# Patient Record
Sex: FEMALE | Race: WHITE | NOT HISPANIC OR LATINO | Employment: OTHER | URBAN - METROPOLITAN AREA
[De-identification: names, ages, dates, MRNs, and addresses within clinical notes are randomized per-mention and may not be internally consistent; named-entity substitution may affect disease eponyms.]

---

## 2020-03-07 ENCOUNTER — HOSPITAL ENCOUNTER (EMERGENCY)
Facility: HOSPITAL | Age: 70
Discharge: HOME | End: 2020-03-07
Attending: EMERGENCY MEDICINE
Payer: MEDICARE

## 2020-03-07 VITALS
OXYGEN SATURATION: 100 % | HEART RATE: 64 BPM | TEMPERATURE: 98 F | RESPIRATION RATE: 16 BRPM | DIASTOLIC BLOOD PRESSURE: 62 MMHG | SYSTOLIC BLOOD PRESSURE: 138 MMHG

## 2020-03-07 DIAGNOSIS — N17.9 AKI (ACUTE KIDNEY INJURY): Primary | ICD-10-CM

## 2020-03-07 DIAGNOSIS — E86.0 DEHYDRATION: ICD-10-CM

## 2020-03-07 DIAGNOSIS — R79.89 ELEVATED SERUM CREATININE: ICD-10-CM

## 2020-03-07 DIAGNOSIS — R73.9 HYPERGLYCEMIA: ICD-10-CM

## 2020-03-07 DIAGNOSIS — R41.82 ACUTE ALTERATION IN MENTAL STATUS: ICD-10-CM

## 2020-03-07 DIAGNOSIS — R00.1 BRADYCARDIA: ICD-10-CM

## 2020-03-07 LAB
ALBUMIN SERPL BCP-MCNC: 3.3 G/DL (ref 3.5–5.2)
ALLENS TEST: ABNORMAL
ALP SERPL-CCNC: 67 U/L (ref 55–135)
ALT SERPL W/O P-5'-P-CCNC: 10 U/L (ref 10–44)
AMMONIA PLAS-SCNC: 19 UMOL/L (ref 10–50)
ANION GAP SERPL CALC-SCNC: 12 MMOL/L (ref 8–16)
AST SERPL-CCNC: 16 U/L (ref 10–40)
B-OH-BUTYR BLD STRIP-SCNC: 0.1 MMOL/L (ref 0–0.5)
BACTERIA #/AREA URNS HPF: ABNORMAL /HPF
BASOPHILS # BLD AUTO: 0.04 K/UL (ref 0–0.2)
BASOPHILS NFR BLD: 0.4 % (ref 0–1.9)
BILIRUB SERPL-MCNC: 0.4 MG/DL (ref 0.1–1)
BILIRUB UR QL STRIP: NEGATIVE
BNP SERPL-MCNC: 36 PG/ML (ref 0–99)
BUN SERPL-MCNC: 24 MG/DL (ref 8–23)
CALCIUM SERPL-MCNC: 8.9 MG/DL (ref 8.7–10.5)
CHLORIDE SERPL-SCNC: 98 MMOL/L (ref 95–110)
CLARITY UR: CLEAR
CO2 SERPL-SCNC: 26 MMOL/L (ref 23–29)
COLOR UR: YELLOW
CREAT SERPL-MCNC: 2.2 MG/DL (ref 0.5–1.4)
DIFFERENTIAL METHOD: ABNORMAL
EOSINOPHIL # BLD AUTO: 0.2 K/UL (ref 0–0.5)
EOSINOPHIL NFR BLD: 1.6 % (ref 0–8)
ERYTHROCYTE [DISTWIDTH] IN BLOOD BY AUTOMATED COUNT: 12.7 % (ref 11.5–14.5)
EST. GFR  (AFRICAN AMERICAN): 26 ML/MIN/1.73 M^2
EST. GFR  (NON AFRICAN AMERICAN): 22 ML/MIN/1.73 M^2
GLUCOSE SERPL-MCNC: 194 MG/DL (ref 70–110)
GLUCOSE UR QL STRIP: NEGATIVE
HCO3 UR-SCNC: 26.8 MMOL/L (ref 24–28)
HCT VFR BLD AUTO: 30.4 % (ref 37–48.5)
HGB BLD-MCNC: 9.6 G/DL (ref 12–16)
HGB UR QL STRIP: NEGATIVE
IMM GRANULOCYTES # BLD AUTO: 0.04 K/UL (ref 0–0.04)
IMM GRANULOCYTES NFR BLD AUTO: 0.4 % (ref 0–0.5)
KETONES UR QL STRIP: NEGATIVE
LACTATE SERPL-SCNC: 1.7 MMOL/L (ref 0.5–2.2)
LEUKOCYTE ESTERASE UR QL STRIP: ABNORMAL
LYMPHOCYTES # BLD AUTO: 2.3 K/UL (ref 1–4.8)
LYMPHOCYTES NFR BLD: 20.8 % (ref 18–48)
MCH RBC QN AUTO: 28.9 PG (ref 27–31)
MCHC RBC AUTO-ENTMCNC: 31.6 G/DL (ref 32–36)
MCV RBC AUTO: 92 FL (ref 82–98)
MICROSCOPIC COMMENT: ABNORMAL
MONOCYTES # BLD AUTO: 0.7 K/UL (ref 0.3–1)
MONOCYTES NFR BLD: 6 % (ref 4–15)
NEUTROPHILS # BLD AUTO: 8 K/UL (ref 1.8–7.7)
NEUTROPHILS NFR BLD: 70.8 % (ref 38–73)
NITRITE UR QL STRIP: NEGATIVE
NRBC BLD-RTO: 0 /100 WBC
PCO2 BLDA: 53.2 MMHG (ref 35–45)
PH SMN: 7.31 [PH] (ref 7.35–7.45)
PH UR STRIP: 6 [PH] (ref 5–8)
PLATELET # BLD AUTO: 322 K/UL (ref 150–350)
PMV BLD AUTO: 11.9 FL (ref 9.2–12.9)
PO2 BLDA: 28 MMHG (ref 40–60)
POC BE: 1 MMOL/L
POC SATURATED O2: 46 % (ref 95–100)
POC TCO2: 28 MMOL/L (ref 24–29)
POCT GLUCOSE: 210 MG/DL (ref 70–110)
POCT GLUCOSE: 250 MG/DL (ref 70–110)
POTASSIUM SERPL-SCNC: 4.3 MMOL/L (ref 3.5–5.1)
PROT SERPL-MCNC: 6.9 G/DL (ref 6–8.4)
PROT UR QL STRIP: NEGATIVE
RBC # BLD AUTO: 3.32 M/UL (ref 4–5.4)
SAMPLE: ABNORMAL
SITE: ABNORMAL
SODIUM SERPL-SCNC: 136 MMOL/L (ref 136–145)
SP GR UR STRIP: 1.01 (ref 1–1.03)
SQUAMOUS #/AREA URNS HPF: 3 /HPF
TROPONIN I SERPL DL<=0.01 NG/ML-MCNC: 0.01 NG/ML (ref 0–0.03)
URN SPEC COLLECT METH UR: ABNORMAL
UROBILINOGEN UR STRIP-ACNC: NEGATIVE EU/DL
WBC # BLD AUTO: 11.26 K/UL (ref 3.9–12.7)
WBC #/AREA URNS HPF: 11 /HPF (ref 0–5)

## 2020-03-07 PROCEDURE — 93005 ELECTROCARDIOGRAM TRACING: CPT

## 2020-03-07 PROCEDURE — 82962 GLUCOSE BLOOD TEST: CPT

## 2020-03-07 PROCEDURE — 96360 HYDRATION IV INFUSION INIT: CPT

## 2020-03-07 PROCEDURE — 82803 BLOOD GASES ANY COMBINATION: CPT

## 2020-03-07 PROCEDURE — 63600175 PHARM REV CODE 636 W HCPCS: Performed by: EMERGENCY MEDICINE

## 2020-03-07 PROCEDURE — 99285 EMERGENCY DEPT VISIT HI MDM: CPT | Mod: 25

## 2020-03-07 PROCEDURE — 82010 KETONE BODYS QUAN: CPT

## 2020-03-07 PROCEDURE — 83605 ASSAY OF LACTIC ACID: CPT

## 2020-03-07 PROCEDURE — 63600175 PHARM REV CODE 636 W HCPCS: Performed by: NURSE PRACTITIONER

## 2020-03-07 PROCEDURE — 87086 URINE CULTURE/COLONY COUNT: CPT

## 2020-03-07 PROCEDURE — 80053 COMPREHEN METABOLIC PANEL: CPT

## 2020-03-07 PROCEDURE — 81000 URINALYSIS NONAUTO W/SCOPE: CPT

## 2020-03-07 PROCEDURE — 85025 COMPLETE CBC W/AUTO DIFF WBC: CPT

## 2020-03-07 PROCEDURE — 96361 HYDRATE IV INFUSION ADD-ON: CPT

## 2020-03-07 PROCEDURE — 82140 ASSAY OF AMMONIA: CPT

## 2020-03-07 PROCEDURE — 83880 ASSAY OF NATRIURETIC PEPTIDE: CPT

## 2020-03-07 PROCEDURE — 99900035 HC TECH TIME PER 15 MIN (STAT)

## 2020-03-07 PROCEDURE — 84484 ASSAY OF TROPONIN QUANT: CPT

## 2020-03-07 RX ADMIN — SODIUM CHLORIDE, SODIUM LACTATE, POTASSIUM CHLORIDE, AND CALCIUM CHLORIDE 1000 ML: .6; .31; .03; .02 INJECTION, SOLUTION INTRAVENOUS at 05:03

## 2020-03-07 RX ADMIN — SODIUM CHLORIDE, SODIUM LACTATE, POTASSIUM CHLORIDE, AND CALCIUM CHLORIDE 1000 ML: .6; .31; .03; .02 INJECTION, SOLUTION INTRAVENOUS at 04:03

## 2020-03-07 RX ADMIN — SODIUM CHLORIDE 1000 ML: 0.9 INJECTION, SOLUTION INTRAVENOUS at 03:03

## 2020-03-07 NOTE — ED PROVIDER NOTES
Encounter Date: 3/7/2020       History     Chief Complaint   Patient presents with    Hyperglycemia     Pt presents to ED today who reports pt glucose was >500 today and blood pressure was low. Family administered novolog PTA. Family reported pt appeared tired and could not keep her eyes open. Pt's speech is somewhat slurred, family reports more than usual.      68 y/o female with diabetes, hypertension and anxiety which presents the emergency room via her family after she had an episode that she went in and out of alertness at approximately 2:29 a.m. this afternoon.  Her son and  are with her and states that she would wake up to sternal rubs and to stimulation but kept falling back asleep.  The son states that she appeared as if she was intoxicated and and they gave her sugary food and drink thinking that her sugar was low.  They checked her sugar since she was a diabetic and it was 553.  The grandson had insulin and gave her 10 units of insulin at 235 p.m..  She also had a pressure of 90/50 at the time.  The son states that once she got the insulin that she woke up and was acting like her normal self but he felt that her speech was slurred.  Upon presenting to the emergency room the patient and  state that her speech is not slurred but the son feels that it is slightly.  The patient chest pain, headache, abdominal pain, shortness of breath, pain with urination or any other symptoms.  She is consistently stating that she wants to go home because she feels much better.      The history is provided by the patient, the spouse and a relative.     Review of patient's allergies indicates:   Allergen Reactions    Ultram [tramadol]      History reviewed. No pertinent past medical history.  History reviewed. No pertinent surgical history.  No family history on file.  Social History     Tobacco Use    Smoking status: Not on file   Substance Use Topics    Alcohol use: Not on file    Drug use: Not on file      Review of Systems   Constitutional: Negative for fever.   HENT: Negative for sore throat.    Respiratory: Negative for shortness of breath.    Cardiovascular: Negative for chest pain.   Gastrointestinal: Negative for nausea.   Genitourinary: Negative for dysuria.   Musculoskeletal: Negative for back pain.   Skin: Negative for rash.   Neurological: Negative for weakness.        Altered mental status   Hematological: Does not bruise/bleed easily.   All other systems reviewed and are negative.    Physical Exam     Initial Vitals   BP Pulse Resp Temp SpO2   03/07/20 1515 03/07/20 1519 03/07/20 1524 03/07/20 1526 03/07/20 1519   (!) 71/48 60 14 98.2 °F (36.8 °C) 99 %      MAP       --                Physical Exam    Nursing note and vitals reviewed.  Constitutional: She appears well-developed and well-nourished.   HENT:   Head: Normocephalic and atraumatic.   Right Ear: External ear normal.   Left Ear: External ear normal.   Nose: Nose normal.   Mouth/Throat: Oropharynx is clear and moist.   Eyes: Conjunctivae and EOM are normal. Pupils are equal, round, and reactive to light.   Neck: Normal range of motion.   Cardiovascular: Regular rhythm, normal heart sounds and intact distal pulses. Bradycardia present.  Exam reveals no gallop and no friction rub.    No murmur heard.  Pulmonary/Chest: Breath sounds normal. No respiratory distress. She has no wheezes. She has no rhonchi. She has no rales. She exhibits no tenderness.   Abdominal: Soft. Bowel sounds are normal. She exhibits no distension and no mass. There is no tenderness. There is no rebound and no guarding.   Musculoskeletal: Normal range of motion. She exhibits no edema or tenderness.   Neurological: She is alert and oriented to person, place, and time. She has normal strength. No cranial nerve deficit or sensory deficit. GCS score is 15. GCS eye subscore is 4. GCS verbal subscore is 5. GCS motor subscore is 6.   No focal neuro deficits   Skin: Skin is warm.  Capillary refill takes less than 2 seconds. No rash and no abscess noted. No erythema.       ED Course   Procedures  Labs Reviewed   CBC W/ AUTO DIFFERENTIAL - Abnormal; Notable for the following components:       Result Value    RBC 3.32 (*)     Hemoglobin 9.6 (*)     Hematocrit 30.4 (*)     Mean Corpuscular Hemoglobin Conc 31.6 (*)     Gran # (ANC) 8.0 (*)     All other components within normal limits   COMPREHENSIVE METABOLIC PANEL - Abnormal; Notable for the following components:    Glucose 194 (*)     BUN, Bld 24 (*)     Creatinine 2.2 (*)     Albumin 3.3 (*)     eGFR if  26 (*)     eGFR if non  22 (*)     All other components within normal limits   URINALYSIS, REFLEX TO URINE CULTURE - Abnormal; Notable for the following components:    Leukocytes, UA 1+ (*)     All other components within normal limits    Narrative:     Preferred Collection Type->Urine, Clean Catch   URINALYSIS MICROSCOPIC - Abnormal; Notable for the following components:    WBC, UA 11 (*)     All other components within normal limits    Narrative:     Preferred Collection Type->Urine, Clean Catch   POCT GLUCOSE - Abnormal; Notable for the following components:    POCT Glucose 210 (*)     All other components within normal limits   POCT GLUCOSE - Abnormal; Notable for the following components:    POCT Glucose 250 (*)     All other components within normal limits   ISTAT PROCEDURE - Abnormal; Notable for the following components:    POC PH 7.310 (*)     POC PCO2 53.2 (*)     POC PO2 28 (*)     POC SATURATED O2 46 (*)     All other components within normal limits   CULTURE, URINE   BETA - HYDROXYBUTYRATE, SERUM   TROPONIN I   B-TYPE NATRIURETIC PEPTIDE   LACTIC ACID, PLASMA   AMMONIA          Imaging Results          CT Head Without Contrast (Final result)  Result time 03/07/20 17:00:56    Final result by Javed Gonzalez MD (03/07/20 17:00:56)                 Impression:      No acute intracranial  process.      Electronically signed by: Javed Gonzalez MD  Date:    03/07/2020  Time:    17:00             Narrative:    EXAMINATION:  CT HEAD WITHOUT CONTRAST    CLINICAL HISTORY:  Confusion/delirium, altered LOC, unexplained;    TECHNIQUE:  Low dose axial images were obtained through the head.  Coronal and sagittal reformations were also performed. Contrast was not administered.    COMPARISON:  No comparison is available.    FINDINGS:  The subcutaneous tissues are unremarkable.  The bony calvarium is intact.  The paranasal sinuses are unremarkable.  The mastoid air cells are clear.  The orbits and intraorbital contents are within normal limits.    The craniocervical junction is within normal limits.  There are no extra-axial fluid collections.  There is no evidence of intracranial hemorrhage.  The ventricles and sulci are prominent, suggestive of cerebral volume loss.  The gray-white differentiation is maintained.  There is no dense vessel sign.  There is no evidence of mass effect.                               X-Ray Chest AP Portable (Final result)  Result time 03/07/20 16:16:26    Final result by Robert Brown MD (03/07/20 16:16:26)                 Impression:      1. No acute cardiopulmonary process, hypoventilatory exam.      Electronically signed by: Robert Brown MD  Date:    03/07/2020  Time:    16:16             Narrative:    EXAMINATION:  XR CHEST AP PORTABLE    CLINICAL HISTORY:  hyperglycemia;    TECHNIQUE:  Single frontal view of the chest was performed.    COMPARISON:  None    FINDINGS:  The cardiomediastinal silhouette is not enlarged.  There is obscuration of the left costophrenic angle, likely related to overlying soft tissue rather than pleural fluid..  The trachea is midline.  The lungs are symmetrically expanded bilaterally without evidence of acute parenchymal process.  There is bandlike atelectasis projected over the right lower lung zone.  No large focal consolidation seen.  There is no  pneumothorax.  The osseous structures are remarkable for degenerative change..                                 Medical Decision Making:   History:   Old Medical Records: I decided to obtain old medical records.  Old Records Summarized: records from another hospital.       <> Summary of Records: Records reviewed from Care everywhere  Initial Assessment:   69-year-old female which presents the emergency room for evaluation of elevated blood sugar and altered mental status which resolved prior to the patient arriving in the emergency room.  Differential Diagnosis:   Stroke, myocardial infarction, pneumonia, UTI, DKA, dehydration, RUBY, hyperglycemia  Clinical Tests:   Lab Tests: Ordered and Reviewed  The following lab test(s) were unremarkable: CBC, Lactate, Troponin, BNP, Urinalysis and Ammonia       <> Summary of Lab: Beta hydroxy within normal limits    Creatinine and BUN are elevated consistent with dehydration and acute kidney injury  Radiological Study: Ordered and Reviewed  Medical Tests: Ordered and Reviewed  ED Management:  Patient examined and has a normal exam except for bradycardia.  Labs consistent with dehydration with acute kidney injury.  Patient did not have any signs of DKA.  She was given 3 L of fluid and her blood pressure and heart rate normalized.  She continued to insist on going home and did not want to be admitted although it was advised that she be monitored overnight.  Patient states that she does not live here and she does not want to be admitted to a hospital not close to her home and that she will go home to [place] and if she needs to be seen by somebody go to the emergency room there.  The patient had been advised to go straight to the emergency room if there any problems.  She has been advised to follow-up with her primary care on Monday to have repeat labs as her kidney function was elevated.  Patient and family want to be discharged and the patient will be discharged with very  close follow-up. Patient given strict return precautions and voiced understanding of all discharge instructions. Pt was stable at discharge.     At the time of discharge the patient was ambulatory and did not have any signs of any neurological deficit.    The patient's case was discussed in length with Dr. Wing Charles.                    ED Course as of Mar 07 2141   Sat Mar 07, 2020   1517 POCT Glucose(!): 210 [AT]   1535 BP(!): 69/29 [AT]   1535 Temp: 98.2 °F (36.8 °C) [AT]   1535 Temp src: Oral [AT]   1535 Pulse(!): 53 [AT]   1535 Resp: 14 [AT]   1535 SpO2: 97 % [AT]   1550 POC PH(!): 7.310 [AT]   1603 WBC: 11.26 [AT]   1603 Hemoglobin(!): 9.6 [AT]   1603 Hematocrit(!): 30.4 [AT]   1604 Beta-Hydroxybutyrate: 0.1 [AT]   1606 BP(!): 80/38 [AT]   1606 Pulse(!): 55 [AT]   1606 Resp: 17 [AT]   1606 SpO2: 96 % [AT]   1621 Ammonia: 19 [AT]   1621 Lactate, Riog: 1.7 [AT]   1628 Troponin I: 0.007 [AT]   1628 BNP: 36 [AT]   1641 WBC, UA(!): 11 [AT]   1641 Leukocytes, UA(!): 1+ [AT]   1717 Creatinine(!): 2.2 [AT]   1717 BUN, Bld(!): 24 [AT]   1725 Pt does not want to be hospitalized and is feeling much better.    Creatinine(!): 2.2 [AT]   1725 BUN, Bld(!): 24 [AT]   1730 BP(!): 90/51 [AT]   1730 Pulse(!): 58 [AT]   1730 Resp: 19 [AT]   1730 SpO2: 100 % [AT]   1747 BP: 108/68 [AT]   1747 Pulse(!): 59 [AT]   1747 Resp: 18 [AT]   1747 SpO2: 100 % [AT]      ED Course User Index  [AT] JESUS Kenyon          Clinical Impression:       ICD-10-CM ICD-9-CM   1. RUBY (acute kidney injury) N17.9 584.9   2. Hyperglycemia R73.9 790.29   3. Acute alteration in mental status R41.82 780.97   4. Bradycardia R00.1 427.89   5. Elevated serum creatinine R79.89 790.99   6. Dehydration E86.0 276.51         ED Disposition Condition    Discharge Stable                         Swati Martinez, [facility]  03/07/20 0354

## 2020-03-08 NOTE — DISCHARGE INSTRUCTIONS
Follow up with your primary care on Monday  Report to the ED immediately if you begin to have any problems  Follow a diabetic diet

## 2020-03-09 LAB
BACTERIA UR CULT: NORMAL
BACTERIA UR CULT: NORMAL